# Patient Record
Sex: FEMALE | URBAN - METROPOLITAN AREA
[De-identification: names, ages, dates, MRNs, and addresses within clinical notes are randomized per-mention and may not be internally consistent; named-entity substitution may affect disease eponyms.]

---

## 2021-06-10 ENCOUNTER — WEB ENCOUNTER (OUTPATIENT)
Dept: URBAN - METROPOLITAN AREA CLINIC 107 | Facility: CLINIC | Age: 36
End: 2021-06-10

## 2021-06-10 ENCOUNTER — OFFICE VISIT (OUTPATIENT)
Dept: URBAN - METROPOLITAN AREA CLINIC 107 | Facility: CLINIC | Age: 36
End: 2021-06-10
Payer: COMMERCIAL

## 2021-06-10 VITALS
WEIGHT: 149 LBS | SYSTOLIC BLOOD PRESSURE: 104 MMHG | HEIGHT: 67 IN | BODY MASS INDEX: 23.39 KG/M2 | TEMPERATURE: 98.4 F | HEART RATE: 72 BPM | RESPIRATION RATE: 18 BRPM | DIASTOLIC BLOOD PRESSURE: 71 MMHG

## 2021-06-10 DIAGNOSIS — K60.2 ANAL FISSURE: ICD-10-CM

## 2021-06-10 PROCEDURE — 99203 OFFICE O/P NEW LOW 30 MIN: CPT | Performed by: INTERNAL MEDICINE

## 2021-06-10 RX ORDER — HYDROCORTISONE 25 MG/G
1 APPLICATION CREAM TOPICAL ONCE A DAY
Status: ACTIVE | COMMUNITY

## 2021-06-10 NOTE — PHYSICAL EXAM RECTAL:
2 small tags, increased tone, anterior tenderness, no blood or lesion in rectal vault, unable to tolerate speculum for anoscopy

## 2021-06-10 NOTE — HPI-TODAY'S VISIT:
Ms Pierre is a 35-year-old woman with no chronic medical problems presenting for evaluation of proctalgia and an anal fissure. She reports having issues with persistent proctalgia for last several months.  The issue started after the birth of her child in November 2020.  She was seen via telemedicine by Dr. Rowan where she was given topical therapy, and recommended at her last televisit in April to continue fiber and MiraLAX. She reports that the symptoms have waxed and waned but overall has been better this past week.  She has been trying to adhere to a high-fiber diet, drinking 2 to 3 L of water a day and urinating MiraLAX powder daily.  Her bowel movements are fairly regular but occasionally are hard consistency will require some straining.  She has rarely seen some red blood on the tissue paper, but otherwise no blood per rectum.  There is no family history of inflammatory bowel disease, colon polyps or colon cancer.  She has not had a colonoscopy in the past.

## 2021-06-11 ENCOUNTER — TELEPHONE ENCOUNTER (OUTPATIENT)
Dept: URBAN - METROPOLITAN AREA CLINIC 107 | Facility: CLINIC | Age: 36
End: 2021-06-11

## 2021-07-22 ENCOUNTER — OFFICE VISIT (OUTPATIENT)
Dept: URBAN - METROPOLITAN AREA CLINIC 107 | Facility: CLINIC | Age: 36
End: 2021-07-22
Payer: COMMERCIAL

## 2021-07-22 VITALS
BODY MASS INDEX: 22.91 KG/M2 | WEIGHT: 146 LBS | RESPIRATION RATE: 18 BRPM | TEMPERATURE: 98 F | SYSTOLIC BLOOD PRESSURE: 101 MMHG | DIASTOLIC BLOOD PRESSURE: 70 MMHG | HEART RATE: 69 BPM | HEIGHT: 67 IN

## 2021-07-22 DIAGNOSIS — K60.2 ANAL FISSURE: ICD-10-CM

## 2021-07-22 PROCEDURE — 99213 OFFICE O/P EST LOW 20 MIN: CPT | Performed by: INTERNAL MEDICINE

## 2021-07-22 RX ORDER — HYDROCORTISONE 25 MG/G
1 APPLICATION CREAM TOPICAL ONCE A DAY
Status: ACTIVE | COMMUNITY

## 2021-07-22 NOTE — HPI-TODAY'S VISIT:
Ms Pierre is a 35-year-old woman with no chronic medical problems presenting for evaluation of proctalgia and an anal fissure. 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  She has been previously seen by Dr. Ghotra via telemedicine visit.  Rectal exam notable for significant anterior tenderness.  Anoscopy was unable to be completed secondary to pain tolerance.  She was to continue medical therapy with nitroglycerin-lidocaine topical cream twice daily and was instructed to use MiraLAX daily along with Benefiber 1 mg 1 twice daily in bowel regularity. She has been compliant with magnesium supplement and is having 1 bowel movement daily without red blood per rectum, melena hematochezia.  She states her proctalgia has improved and resolved after initiation of topical nitroglycerin/lidocaine cream.  She denies any other GI complaints today.  There is no abdominal pain, nausea or vomiting.  She denies any upper GI complaints.  Her M.

## 2021-11-12 ENCOUNTER — DASHBOARD ENCOUNTERS (OUTPATIENT)
Age: 36
End: 2021-11-12

## 2021-11-12 ENCOUNTER — OFFICE VISIT (OUTPATIENT)
Dept: URBAN - METROPOLITAN AREA CLINIC 107 | Facility: CLINIC | Age: 36
End: 2021-11-12
Payer: COMMERCIAL

## 2021-11-12 VITALS
HEIGHT: 67 IN | BODY MASS INDEX: 22.91 KG/M2 | WEIGHT: 146 LBS | SYSTOLIC BLOOD PRESSURE: 94 MMHG | TEMPERATURE: 98.3 F | HEART RATE: 74 BPM | DIASTOLIC BLOOD PRESSURE: 64 MMHG

## 2021-11-12 DIAGNOSIS — K64.8 INTERNAL HEMORRHOIDS: ICD-10-CM

## 2021-11-12 DIAGNOSIS — K60.2 ANAL FISSURE: ICD-10-CM

## 2021-11-12 DIAGNOSIS — K59.09 CHRONIC CONSTIPATION: ICD-10-CM

## 2021-11-12 PROCEDURE — 99213 OFFICE O/P EST LOW 20 MIN: CPT | Performed by: INTERNAL MEDICINE

## 2021-11-12 RX ORDER — HYDROCORTISONE 25 MG/G
1 APPLICATION CREAM TOPICAL ONCE A DAY
Status: ON HOLD | COMMUNITY

## 2021-11-12 NOTE — HPI-TODAY'S VISIT:
Ms Pierre is a 36-year-old woman presenting for evaluation of proctalgia and an anal fissure. She was last seen on 7/22/2021 for follow-up of an anal fissure treated with nitroglycerin-lidocaine twice daily, and constipation treated with MiraLAX daily and Benefiber 1 tablespoon twice daily. She returns for follow-up overall doing well.  She is taking flaxseed fiber twice daily and not taking MiraLAX.  She occasionally uses magnesium powder for relief of exacerbations of constipation.  She reports that her bowel habits are normal consistency.  No perianal pain or bleeding.  She typically has 2 bowel movements daily. She also denies any nausea, vomiting, heartburn or difficulty swallowing.

## 2021-11-25 PROBLEM — 90458007: Status: ACTIVE | Noted: 2021-11-25

## 2021-11-25 PROBLEM — 30037006: Status: ACTIVE | Noted: 2021-11-25

## 2021-11-25 PROBLEM — 236069009: Status: ACTIVE | Noted: 2021-11-25
